# Patient Record
Sex: MALE | NOT HISPANIC OR LATINO | URBAN - METROPOLITAN AREA
[De-identification: names, ages, dates, MRNs, and addresses within clinical notes are randomized per-mention and may not be internally consistent; named-entity substitution may affect disease eponyms.]

---

## 2017-09-27 ENCOUNTER — EMERGENCY (EMERGENCY)
Facility: HOSPITAL | Age: 22
LOS: 1 days | Discharge: HOME | End: 2017-09-27
Attending: EMERGENCY MEDICINE | Admitting: EMERGENCY MEDICINE
Payer: COMMERCIAL

## 2017-09-27 VITALS
RESPIRATION RATE: 16 BRPM | DIASTOLIC BLOOD PRESSURE: 62 MMHG | TEMPERATURE: 98 F | SYSTOLIC BLOOD PRESSURE: 127 MMHG | OXYGEN SATURATION: 98 % | HEART RATE: 58 BPM

## 2017-09-27 PROCEDURE — 72125 CT NECK SPINE W/O DYE: CPT

## 2017-09-27 PROCEDURE — 99284 EMERGENCY DEPT VISIT MOD MDM: CPT | Mod: 25

## 2017-09-27 PROCEDURE — 99284 EMERGENCY DEPT VISIT MOD MDM: CPT

## 2017-09-27 PROCEDURE — 72125 CT NECK SPINE W/O DYE: CPT | Mod: 26

## 2017-09-27 RX ORDER — ACETAMINOPHEN 500 MG
975 TABLET ORAL ONCE
Qty: 0 | Refills: 0 | Status: COMPLETED | OUTPATIENT
Start: 2017-09-27 | End: 2017-09-27

## 2017-09-27 RX ORDER — IBUPROFEN 200 MG
600 TABLET ORAL ONCE
Qty: 0 | Refills: 0 | Status: COMPLETED | OUTPATIENT
Start: 2017-09-27 | End: 2017-09-27

## 2017-09-27 RX ADMIN — Medication 975 MILLIGRAM(S): at 17:33

## 2017-09-27 NOTE — ED ADULT NURSE NOTE - OBJECTIVE STATEMENT
22 y.o. Male BIBA for neck injury. Pt reports practicing lacrosse by self and a dummy in the morning at approx 0730am and pt tripped and hit the dummy head first. Pt states he "speared" the dummy and felt a tooth chip on his R canine tooth. Denies LOC. Ambulatory afterwards. Pt was feeling worsening and stiffening pain on neck and "traps." Pt tried muscle relaxer cream - partial relief. Neuro intact. Denies numbness/tingling, changes in vision/hearing. Pt is in no current distress. Comfort and safety provided. Will continue to monitor.

## 2017-09-27 NOTE — ED PROVIDER NOTE - ATTENDING CONTRIBUTION TO CARE
Pt with posterior neck and trapezius pain after falling during lacrosse practice.  No focal neuro deficits, mild td mid cervical.

## 2017-09-27 NOTE — ED PROVIDER NOTE - OBJECTIVE STATEMENT
22 YOM sent in from [facility] for neck injury. Pt fell forward while playing lacrosse this morning, hitting his head into a training dummy. Pt heard a crack and crunching sound in his neck and has had progressively worsening neck pain then. PT denies any numbness, tingling or weakness in his extremities. Pt denies any fevers, chills.

## 2017-10-19 PROBLEM — Z00.00 ENCOUNTER FOR PREVENTIVE HEALTH EXAMINATION: Status: ACTIVE | Noted: 2017-10-19

## 2017-10-23 ENCOUNTER — APPOINTMENT (OUTPATIENT)
Dept: ORTHOPEDIC SURGERY | Facility: CLINIC | Age: 22
End: 2017-10-23
Payer: OTHER GOVERNMENT

## 2017-10-23 VITALS
BODY MASS INDEX: 25.77 KG/M2 | HEIGHT: 70 IN | DIASTOLIC BLOOD PRESSURE: 79 MMHG | WEIGHT: 180 LBS | HEART RATE: 62 BPM | SYSTOLIC BLOOD PRESSURE: 144 MMHG

## 2017-10-23 DIAGNOSIS — Z78.9 OTHER SPECIFIED HEALTH STATUS: ICD-10-CM

## 2017-10-23 DIAGNOSIS — Z56.0 UNEMPLOYMENT, UNSPECIFIED: ICD-10-CM

## 2017-10-23 PROCEDURE — 99203 OFFICE O/P NEW LOW 30 MIN: CPT

## 2017-10-23 RX ORDER — CYCLOBENZAPRINE HYDROCHLORIDE 5 MG/1
5 TABLET, FILM COATED ORAL
Qty: 18 | Refills: 0 | Status: ACTIVE | COMMUNITY
Start: 2017-09-28

## 2017-10-23 RX ORDER — MONTELUKAST 10 MG/1
10 TABLET, FILM COATED ORAL
Qty: 30 | Refills: 0 | Status: ACTIVE | COMMUNITY
Start: 2017-07-03

## 2017-10-23 RX ORDER — IBUPROFEN 800 MG/1
800 TABLET, FILM COATED ORAL
Qty: 30 | Refills: 0 | Status: ACTIVE | COMMUNITY
Start: 2017-09-28

## 2017-10-23 RX ORDER — AZELASTINE HYDROCHLORIDE 137 UG/1
0.1 SPRAY, METERED NASAL
Qty: 30 | Refills: 0 | Status: ACTIVE | COMMUNITY
Start: 2017-06-22

## 2017-10-23 RX ORDER — MUPIROCIN 20 MG/G
2 OINTMENT TOPICAL
Qty: 22 | Refills: 0 | Status: ACTIVE | COMMUNITY
Start: 2017-07-21

## 2017-10-23 RX ORDER — KETOCONAZOLE 20 MG/G
2 CREAM TOPICAL
Qty: 60 | Refills: 0 | Status: ACTIVE | COMMUNITY
Start: 2017-07-21

## 2017-10-23 SDOH — ECONOMIC STABILITY - INCOME SECURITY: UNEMPLOYMENT, UNSPECIFIED: Z56.0

## 2017-11-17 ENCOUNTER — APPOINTMENT (OUTPATIENT)
Dept: ORTHOPEDIC SURGERY | Facility: CLINIC | Age: 22
End: 2017-11-17

## 2017-12-14 ENCOUNTER — APPOINTMENT (OUTPATIENT)
Dept: ORTHOPEDIC SURGERY | Facility: CLINIC | Age: 22
End: 2017-12-14
Payer: OTHER GOVERNMENT

## 2017-12-14 VITALS
SYSTOLIC BLOOD PRESSURE: 136 MMHG | HEIGHT: 70 IN | BODY MASS INDEX: 25.77 KG/M2 | WEIGHT: 180 LBS | DIASTOLIC BLOOD PRESSURE: 76 MMHG | HEART RATE: 66 BPM

## 2017-12-14 DIAGNOSIS — M54.2 CERVICALGIA: ICD-10-CM

## 2017-12-14 PROCEDURE — 99213 OFFICE O/P EST LOW 20 MIN: CPT

## 2017-12-14 RX ORDER — SULFAMETHOXAZOLE AND TRIMETHOPRIM 800; 160 MG/1; MG/1
800-160 TABLET ORAL
Qty: 14 | Refills: 0 | Status: ACTIVE | COMMUNITY
Start: 2017-07-24

## 2022-06-30 NOTE — ED ADULT NURSE NOTE - CAS TRG GEN SKIN CONDITION
Aware of patient. Increase pain after exercising. History of TKA. On Coumadin. Will get CT of knee and look for effusion/hemarthrosis and evaluate extensor mechanism.    Warm/Dry

## 2023-08-18 DIAGNOSIS — M25.551 BILATERAL HIP PAIN: Primary | ICD-10-CM

## 2023-08-18 DIAGNOSIS — M25.552 BILATERAL HIP PAIN: Primary | ICD-10-CM

## 2023-08-22 ENCOUNTER — OFFICE VISIT (OUTPATIENT)
Dept: ORTHOPEDICS | Facility: CLINIC | Age: 28
End: 2023-08-22
Payer: OTHER GOVERNMENT

## 2023-08-22 ENCOUNTER — HOSPITAL ENCOUNTER (OUTPATIENT)
Dept: RADIOLOGY | Facility: HOSPITAL | Age: 28
Discharge: HOME | End: 2023-08-22
Attending: ORTHOPAEDIC SURGERY
Payer: OTHER GOVERNMENT

## 2023-08-22 VITALS — BODY MASS INDEX: 24.34 KG/M2 | WEIGHT: 170 LBS | HEIGHT: 70 IN

## 2023-08-22 DIAGNOSIS — M54.16 LUMBAR RADICULOPATHY, CHRONIC: Primary | ICD-10-CM

## 2023-08-22 DIAGNOSIS — M25.552 BILATERAL HIP PAIN: ICD-10-CM

## 2023-08-22 DIAGNOSIS — M25.551 BILATERAL HIP PAIN: ICD-10-CM

## 2023-08-22 PROCEDURE — 73521 X-RAY EXAM HIPS BI 2 VIEWS: CPT | Mod: TC,PO

## 2023-08-22 PROCEDURE — 99204 OFFICE O/P NEW MOD 45 MIN: CPT | Mod: S$PBB,,, | Performed by: ORTHOPAEDIC SURGERY

## 2023-08-22 PROCEDURE — 99213 OFFICE O/P EST LOW 20 MIN: CPT | Mod: PBBFAC,PN | Performed by: ORTHOPAEDIC SURGERY

## 2023-08-22 PROCEDURE — 73521 X-RAY EXAM HIPS BI 2 VIEWS: CPT | Mod: 26,,, | Performed by: RADIOLOGY

## 2023-08-22 PROCEDURE — 99999 PR PBB SHADOW E&M-EST. PATIENT-LVL III: CPT | Mod: PBBFAC,,, | Performed by: ORTHOPAEDIC SURGERY

## 2023-08-22 PROCEDURE — 99204 PR OFFICE/OUTPT VISIT, NEW, LEVL IV, 45-59 MIN: ICD-10-PCS | Mod: S$PBB,,, | Performed by: ORTHOPAEDIC SURGERY

## 2023-08-22 PROCEDURE — 99999 PR PBB SHADOW E&M-EST. PATIENT-LVL III: ICD-10-PCS | Mod: PBBFAC,,, | Performed by: ORTHOPAEDIC SURGERY

## 2023-08-22 PROCEDURE — 73521 XR HIPS BILATERAL 2 VIEW INCL AP PELVIS: ICD-10-PCS | Mod: 26,,, | Performed by: RADIOLOGY

## 2023-08-22 NOTE — PROGRESS NOTES
Chief Complaint   Patient presents with    Left Hip - Pain    Right Hip - Pain       HPI:    This is a 28 y.o. who presents today complaining of left leg numbness and pain for 2 years after no known trauma. Pain is throbbing. No new numbness or tingling. No associated signs or symptoms.      History reviewed. No pertinent past medical history.   History reviewed. No pertinent surgical history.   No current outpatient medications on file prior to visit.     No current facility-administered medications on file prior to visit.      Review of patient's allergies indicates:  No Known Allergies   Family History not pertinent   Social History     Socioeconomic History    Marital status: Single         Review of Systems:   Constitutional:  Denies fever or chills    Eyes:  Denies change in visual acuity    HENT:  Denies nasal congestion or sore throat    Respiratory:  Denies cough or shortness of breath    Cardiovascular:  Denies chest pain or edema    GI:  Denies abdominal pain, nausea, vomiting, bloody stools or diarrhea    :  Denies dysuria    Integument:  Denies rash    Neurologic:  Denies headache, focal weakness or sensory changes    Endocrine:  Denies polyuria or polydipsia    Lymphatic:  Denies swollen glands    Psychiatric:  Denies depression or anxiety       Physical Exam:    Constitutional:  Well developed, well nourished, no acute distress, non-toxic appearance    Integument:  Well hydrated, no rash    Lymphatic:  No lymphadenopathy noted    Neurologic:  Alert & oriented x 3,     Psychiatric:  Speech and behavior appropriate    Gi: abdomen soft  Eyes: EOMI   L spine  No point TTP or stepoffs. Overall normal alignment. Worsened with ROM. Skin intact. +radiculopathy about the left S1 nerve root distribution. Compartments soft. Foot perfused.      X-rays were performed today, personally reviewed by me and findings discussed with the patient.   2 views of the lumbar spine show no fractures or dislocations with mild  listhesis about L5-S1      Lumbar radiculopathy, chronic  -     Ambulatory referral/consult to Pain Clinic; Future; Expected date: 08/29/2023        Will refer to pain management. RTC as needed.

## 2023-08-30 ENCOUNTER — HOSPITAL ENCOUNTER (EMERGENCY)
Facility: OTHER | Age: 28
Discharge: HOME | End: 2023-08-30
Attending: EMERGENCY MEDICINE
Payer: OTHER GOVERNMENT

## 2023-08-30 VITALS
SYSTOLIC BLOOD PRESSURE: 119 MMHG | OXYGEN SATURATION: 98 % | HEIGHT: 70 IN | TEMPERATURE: 98 F | RESPIRATION RATE: 20 BRPM | BODY MASS INDEX: 24.34 KG/M2 | HEART RATE: 78 BPM | WEIGHT: 170 LBS | DIASTOLIC BLOOD PRESSURE: 75 MMHG

## 2023-08-30 DIAGNOSIS — M51.36 BULGING LUMBAR DISC: ICD-10-CM

## 2023-08-30 DIAGNOSIS — M54.50 ACUTE BILATERAL LOW BACK PAIN WITHOUT SCIATICA: Primary | ICD-10-CM

## 2023-08-30 PROCEDURE — 63600175 PHARM REV CODE 636 W HCPCS: Performed by: EMERGENCY MEDICINE

## 2023-08-30 PROCEDURE — 96372 THER/PROPH/DIAG INJ SC/IM: CPT | Performed by: EMERGENCY MEDICINE

## 2023-08-30 PROCEDURE — 99285 EMERGENCY DEPT VISIT HI MDM: CPT | Mod: 25

## 2023-08-30 RX ORDER — DEXTROAMPHETAMINE SULFATE 10 MG/1
10 CAPSULE, EXTENDED RELEASE ORAL
COMMUNITY
Start: 2023-05-25

## 2023-08-30 RX ORDER — IBUPROFEN 600 MG/1
600 TABLET ORAL EVERY 6 HOURS PRN
Qty: 20 TABLET | Refills: 0 | Status: SHIPPED | OUTPATIENT
Start: 2023-08-30

## 2023-08-30 RX ORDER — ZOLPIDEM TARTRATE 10 MG/1
10 TABLET ORAL
COMMUNITY
Start: 2023-05-25

## 2023-08-30 RX ORDER — METHYLPREDNISOLONE 4 MG/1
TABLET ORAL
Qty: 1 EACH | Refills: 0 | Status: SHIPPED | OUTPATIENT
Start: 2023-08-30 | End: 2023-09-20

## 2023-08-30 RX ORDER — PREDNISONE 20 MG/1
TABLET ORAL
COMMUNITY
Start: 2023-05-06

## 2023-08-30 RX ORDER — DEXAMETHASONE SODIUM PHOSPHATE 4 MG/ML
8 INJECTION, SOLUTION INTRA-ARTICULAR; INTRALESIONAL; INTRAMUSCULAR; INTRAVENOUS; SOFT TISSUE
Status: COMPLETED | OUTPATIENT
Start: 2023-08-30 | End: 2023-08-30

## 2023-08-30 RX ORDER — KETOROLAC TROMETHAMINE 30 MG/ML
10 INJECTION, SOLUTION INTRAMUSCULAR; INTRAVENOUS
Status: COMPLETED | OUTPATIENT
Start: 2023-08-30 | End: 2023-08-30

## 2023-08-30 RX ORDER — ACETAMINOPHEN 500 MG
1000 TABLET ORAL EVERY 6 HOURS PRN
Qty: 50 TABLET | Refills: 0 | Status: SHIPPED | OUTPATIENT
Start: 2023-08-30

## 2023-08-30 RX ORDER — METHOCARBAMOL 500 MG/1
500 TABLET, FILM COATED ORAL 3 TIMES DAILY PRN
Qty: 30 TABLET | Refills: 0 | Status: SHIPPED | OUTPATIENT
Start: 2023-08-30 | End: 2023-09-09

## 2023-08-30 RX ADMIN — DEXAMETHASONE SODIUM PHOSPHATE 8 MG: 4 INJECTION INTRA-ARTICULAR; INTRALESIONAL; INTRAMUSCULAR; INTRAVENOUS; SOFT TISSUE at 03:08

## 2023-08-30 RX ADMIN — KETOROLAC TROMETHAMINE 10 MG: 30 INJECTION, SOLUTION INTRAMUSCULAR; INTRAVENOUS at 03:08

## 2023-08-30 NOTE — DISCHARGE INSTRUCTIONS
Mr. Mcleod,    You have a herniated disc at the L5-S1 space causing your symptoms.   Please schedule an appointment [facility] for spine clinic followup.    Thank you for letting me care for you today! It was nice meeting you, and I hope you feel better soon.   If you would like access to your chart and what was done today please utilize the [facility].   Please come back to [facility] for all of your future medical needs.    Our goal in the emergency department is to always give you outstanding care and exceptional service. You may receive a survey by mail or e-mail in the next week regarding your experience in our ED. We would greatly appreciate you completing and returning the survey. Your feedback provides us with a way to recognize our staff who give very good care and it helps us learn how to improve when your experience was below our aspiration of excellence.     Sincerely,    Roderick Mejia MD  Board Certified Emergency Physician   
No

## 2023-08-30 NOTE — ED PROVIDER NOTES
"Encounter Date: 8/30/2023    SCRIBE #1 NOTE: I, Tabathaart Mosley, am scribing for, and in the presence of,  Roderick Mejia MD.       History     Chief Complaint   Patient presents with    Back Pain     Reports lower back pain onset Wednesday, worsening Monday while walking his dog. States 'i think its a disc issue". Reports hx of back problems r/t service in the . Has been unable to stand up/sit without 10/10 pain. Denies hx of MRIs.      Toni Mcleod is a 28 y.o. male who presents to the ED with lower back pain for 3 days. He reports sudden sharp pains while walking his dog and he has been unable to stand or walk without severe pain since then. He denies urinary or fecal incontinence, though he reports some pain with bowel movements. He is a  in the . He reports a history of similar back pain 1 year ago after sustaining G forces during a flight, though his pain was not as severe as his current pain. He has been going to physical therapy since, and reports relief with ice and ibuprofen with his previous pain flare-ups. He states that his flight doctor recommended that he get an MRI. He has not had an MRI in the past.     The history is provided by the patient.     Review of patient's allergies indicates:  No Known Allergies  History reviewed. No pertinent past medical history.  History reviewed. No pertinent surgical history.  History reviewed. No pertinent family history.     Review of Systems  Constitutional-no fever  HEENT-no congestion  Eyes-no redness  Respiratory-no shortness of breath  Cardio-no chest pain  GI-no abdominal pain  Endocrine-no cold intolerance  -no difficulty urinating  MSK-no myalgias, notes back pain  Skin-no rashes  Allergy-no environmental allergy  Neurologic-no headache  Hematology-no swollen nodes  Behavioral-no confusion    Physical Exam     Initial Vitals [08/30/23 1226]   BP Pulse Resp Temp SpO2   109/64 78 20 98.2 °F (36.8 °C) 98 %      MAP       --     "     Physical Exam  Constitutional:  Uncomfortable appearing 28-year-old man in moderate distress  Eyes: Conjunctivae normal.  ENT       Head: Normocephalic, atraumatic.       Nose: No congestion.       Mouth/Throat: Mucous membranes are moist.  Hematological/Lymphatic/Immunilogical: No cervical lymphadenopathy.  Cardiovascular: Normal rate, regular rhythm. Normal and symmetric distal pulses.  Respiratory: Normal respiratory effort. Breath sounds are normal.  Gastrointestinal: Soft, nontender.   Musculoskeletal: Normal range of motion in all extremities.  Positive straight leg raise test bilaterally  Neurologic: Alert, oriented. Normal speech and language. No gross focal neurologic deficits are appreciated.  Skin: Skin is warm, dry. No rash noted.  Psychiatric: Mood and affect are normal.     ED Course   Procedures  Labs Reviewed - No data to display       Imaging Results              CT Lumbar Spine Without Contrast (Final result)  Result time 08/30/23 16:20:43      Final result by Asif Narvaez MD (08/30/23 16:20:43)                   Impression:      No evidence of acute fracture or traumatic malalignment of the lumbar spine.    Diffuse disc bulge at the L5-S1 level with superimposed central disc protrusion.  Noncontrast MRI of the lumbar spine may be obtained for further characterization.    Additional findings as above.      Electronically signed by: Asif Narvaez MD  Date:    08/30/2023  Time:    16:20               Narrative:    EXAMINATION:  CT LUMBAR SPINE WITHOUT CONTRAST    CLINICAL HISTORY:  Low back pain, symptoms persist with > 6wks conservative treatment;Low back pain, progressive neurologic deficit;    TECHNIQUE:  Low-dose axial, sagittal and coronal reformations are obtained through the lumbar spine.  Contrast was not administered.    COMPARISON:  X-ray of the lumbar spine dated 08/22/2023.    FINDINGS:  There is straightening of normal lumbar lordosis.  There is also minimal leftward curvature of the  lumbar alignment.  There is no evidence of listhesis.    There are 5 lumbar type vertebral bodies.  The vertebral body heights are maintained.  The posterior elements are unremarkable.    There is disc desiccation in the lower lumbar spine.  Evaluation of the individual disc levels reveals the following:    L1-L2, unremarkable.    L2-L3, unremarkable    L3-L4, unremarkable    L4-L5, there is a disc bulge along with facet hypertrophy and ligamentum flavum hypertrophy.  No significant spinal canal narrowing identified.  The neural foramina is unremarkable.    L5-S1, there is diffuse disc bulge along with facet hypertrophy and ligamentum flavum hypertrophy.  There is superimposed central disc protrusion.  There is suspected moderate narrowing of the spinal canal.  There is mild bilateral neural foraminal narrowing.    There are minimal degenerative changes in the sacroiliac joints.  There is a small osteophyte along the anterior aspect of the right sacroiliac joint.  There are no erosive changes.    The paraspinal soft tissues are within normal limits.                                       Medications   ketorolac injection 9.999 mg (9.999 mg Intramuscular Given 8/30/23 1517)   dexAMETHasone injection 8 mg (8 mg Intramuscular Given 8/30/23 1518)     Medical Decision Making  Low back pain is a profoundly broad differential including benign back strains and spasms to serious structural issues such as cauda equina syndrome, prolapsed disc, epidural abscess or pathologic fractures even to back pain mimics like pyelonephritis, AAA or ischemic bowel.    This patient does not have significant fever, no neurologic impairment to suggest compressive lesion such as epidural abscess or cauda equina syndrome.   Reflexes are appropriate and at baseline.     The differential was considered and the appropriate diagnostics were ordered therefore with a disposition determination in line with the most likely underlying cause.      Discussed extensively the need for outpatient follow-up, MRI ordered, referral placed for spine evaluation.  Discussed return precautions related to cauda equina syndrome and compressive lesion.    Amount and/or Complexity of Data Reviewed  Independent Historian: spouse     Details: This past notes intensifying symptoms  External Data Reviewed: labs, radiology, ECG and notes.     Details: Previously evaluated in orthopedics clinic referred for spine evaluation unable to obtain  Radiology: ordered and independent interpretation performed.     Details: Independently reviewed CT imaging of the lumbar spine and agree with Radiology interpretation    Risk  OTC drugs.  Prescription drug management.  Decision regarding hospitalization.            Scribe Attestation:   Scribe #1: I performed the above scribed service and the documentation accurately describes the services I performed. I attest to the accuracy of the note.    Physician Attestation for Scribe: I, roderick mejia, reviewed documentation as scribed in my presence, which is both accurate and complete.                      Clinical Impression:   Final diagnoses:  [M54.50] Acute bilateral low back pain without sciatica (Primary)  [M51.36] Bulging lumbar disc        ED Disposition Condition    Discharge Stable          ED Prescriptions       Medication Sig Dispense Start Date End Date Auth. Provider    methylPREDNISolone (MEDROL DOSEPACK) 4 mg tablet Take as package directs 1 each 8/30/2023 9/20/2023 Roderick Mejia MD    methocarbamoL (ROBAXIN) 500 MG Tab Take 1 tablet (500 mg total) by mouth 3 (three) times daily as needed (spasm). 30 tablet 8/30/2023 9/9/2023 Roderick Mejia MD    ibuprofen (ADVIL,MOTRIN) 600 MG tablet Take 1 tablet (600 mg total) by mouth every 6 (six) hours as needed for Pain. 20 tablet 8/30/2023 -- Roderick Mejia MD    acetaminophen (TYLENOL) 500 MG tablet Take 2 tablets (1,000 mg total) by mouth every 6 (six) hours as  needed. 50 tablet 8/30/2023 -- Roderick Mejia MD          Follow-up Information       Follow up With Specialties Details Why Contact Info Additional Information    [facility] Schedule an appointment as soon as possible for a visit  For a follow up visit about today [address], [address]  [facility] [address]  [phone] [facility], 4th Floor Please park in [place] [address] and use [place] elevators             Roderick Mejia MD  08/30/23 4812

## 2023-08-31 DIAGNOSIS — M54.16 LUMBAR RADICULOPATHY: Primary | ICD-10-CM

## 2023-09-01 ENCOUNTER — NURSE TRIAGE (OUTPATIENT)
Dept: ADMINISTRATIVE | Facility: CLINIC | Age: 28
End: 2023-09-01
Payer: OTHER GOVERNMENT

## 2023-09-01 NOTE — TELEPHONE ENCOUNTER
Reason for Disposition   Nursing judgment    Protocols used: Information Only Call - No Triage-A-OH    Pt states he has an upcoming appointment with Dr. Cabral on 9/5/23. States he has a question for the office nurse. Advised pt this message will go to the Provider and office staff now to contact him directly. Pt verbalized understanding.

## 2023-09-05 ENCOUNTER — TELEPHONE (OUTPATIENT)
Dept: PAIN MEDICINE | Facility: CLINIC | Age: 28
End: 2023-09-05
Payer: OTHER GOVERNMENT

## 2023-09-05 ENCOUNTER — OFFICE VISIT (OUTPATIENT)
Dept: SPINE | Facility: CLINIC | Age: 28
End: 2023-09-05
Payer: OTHER GOVERNMENT

## 2023-09-05 VITALS
WEIGHT: 170 LBS | BODY MASS INDEX: 24.34 KG/M2 | HEIGHT: 70 IN | DIASTOLIC BLOOD PRESSURE: 63 MMHG | SYSTOLIC BLOOD PRESSURE: 121 MMHG | HEART RATE: 63 BPM

## 2023-09-05 DIAGNOSIS — M51.36 BULGING LUMBAR DISC: ICD-10-CM

## 2023-09-05 DIAGNOSIS — M54.16 LUMBAR RADICULOPATHY: ICD-10-CM

## 2023-09-05 DIAGNOSIS — M51.36 DDD (DEGENERATIVE DISC DISEASE), LUMBAR: Primary | ICD-10-CM

## 2023-09-05 PROCEDURE — 99204 OFFICE O/P NEW MOD 45 MIN: CPT | Mod: S$PBB,,, | Performed by: STUDENT IN AN ORGANIZED HEALTH CARE EDUCATION/TRAINING PROGRAM

## 2023-09-05 PROCEDURE — 99999 PR PBB SHADOW E&M-EST. PATIENT-LVL IV: ICD-10-PCS | Mod: PBBFAC,,, | Performed by: STUDENT IN AN ORGANIZED HEALTH CARE EDUCATION/TRAINING PROGRAM

## 2023-09-05 PROCEDURE — 99214 OFFICE O/P EST MOD 30 MIN: CPT | Mod: PBBFAC | Performed by: STUDENT IN AN ORGANIZED HEALTH CARE EDUCATION/TRAINING PROGRAM

## 2023-09-05 PROCEDURE — 99204 PR OFFICE/OUTPT VISIT, NEW, LEVL IV, 45-59 MIN: ICD-10-PCS | Mod: S$PBB,,, | Performed by: STUDENT IN AN ORGANIZED HEALTH CARE EDUCATION/TRAINING PROGRAM

## 2023-09-05 PROCEDURE — 99999 PR PBB SHADOW E&M-EST. PATIENT-LVL IV: CPT | Mod: PBBFAC,,, | Performed by: STUDENT IN AN ORGANIZED HEALTH CARE EDUCATION/TRAINING PROGRAM

## 2023-09-05 RX ORDER — GABAPENTIN 300 MG/1
300 CAPSULE ORAL 3 TIMES DAILY
Qty: 90 CAPSULE | Refills: 1 | Status: SHIPPED | OUTPATIENT
Start: 2023-09-05 | End: 2023-11-04

## 2023-09-05 NOTE — PROGRESS NOTES
Chronic Pain - New Consult    Referring Physician: Roderick Mejia MD    Chief Complaint:   Chief Complaint   Patient presents with    Back Pain        SUBJECTIVE:    Toni Mcleod presents to the clinic for the evaluation of lower back and left leg > right leg pain. The pain started about 2 years ago (October 2021) following a walk with his dog and symptoms have been unchanged.  He states he was able to manage with physical therapy in the past, however now it has been 8 days since he's been able to walk due to the pain.  The pain is located in the lower back area and radiates to the left > right leg.  The pain is described as sharp and shooting and is rated as 8/10.  He states the pain radiates to the front of his legs (also in the ankles) The pain is rated with a score of  4/10 on the BEST day and a score of 10/10 on the WORST day.  Symptoms interfere with daily activity, sleeping, and work. The pain is exacerbated by Sitting and Coughing/Sneezing, and standing.  The pain is mitigated by laying down and laying prone. The patient reports 5 hours of uninterrupted sleep per night.    He presented to the Emergency department on 8/30/23 due to this pain and was started on medrol dose pack, tylenol, robaxin, and ibuprofen.    Patient denies urinary incontinence, bowel incontinence, and significant motor weakness.    Physical Therapy/Home Exercise: yes, completed PT on Thursday      Pain Disability Index Review:       No data to display                Pain Medications:   - tizanidine   - medrol dose pack   - ibuprofen   - tyleneol     report:  Reviewed and consistent with medication use as prescribed.    Pain Procedures:   None    Imaging:   CT LUMBAR SPINE WITHOUT CONTRAST     CLINICAL HISTORY:  Low back pain, symptoms persist with > 6wks conservative treatment;Low back pain, progressive neurologic deficit;     TECHNIQUE:  Low-dose axial, sagittal and coronal reformations are obtained through the lumbar spine.   Contrast was not administered.     COMPARISON:  X-ray of the lumbar spine dated 08/22/2023.     FINDINGS:  There is straightening of normal lumbar lordosis.  There is also minimal leftward curvature of the lumbar alignment.  There is no evidence of listhesis.     There are 5 lumbar type vertebral bodies.  The vertebral body heights are maintained.  The posterior elements are unremarkable.     There is disc desiccation in the lower lumbar spine.  Evaluation of the individual disc levels reveals the following:     L1-L2, unremarkable.     L2-L3, unremarkable     L3-L4, unremarkable     L4-L5, there is a disc bulge along with facet hypertrophy and ligamentum flavum hypertrophy.  No significant spinal canal narrowing identified.  The neural foramina is unremarkable.     L5-S1, there is diffuse disc bulge along with facet hypertrophy and ligamentum flavum hypertrophy.  There is superimposed central disc protrusion.  There is suspected moderate narrowing of the spinal canal.  There is mild bilateral neural foraminal narrowing.     There are minimal degenerative changes in the sacroiliac joints.  There is a small osteophyte along the anterior aspect of the right sacroiliac joint.  There are no erosive changes.     The paraspinal soft tissues are within normal limits.     Impression:     No evidence of acute fracture or traumatic malalignment of the lumbar spine.     Diffuse disc bulge at the L5-S1 level with superimposed central disc protrusion.  Noncontrast MRI of the lumbar spine may be obtained for further characterization.     Additional findings as above.        Electronically signed by: Asif Narvaez MD  Date:                                            08/30/2023  Time:                                           16:20    XR HIPS BILATERAL 2 VIEW INCL AP PELVIS     CLINICAL HISTORY:  Pain in right hip     TECHNIQUE:  AP view of the pelvis and frogleg lateral views of both hips were performed.     COMPARISON:  None.      FINDINGS:  A fracture of either hip is not seen.  The femoral heads and acetabula are well maintained.  A 7 mm radiodensity in the intertrochanteric fracture of the right femur is a probable benign bone island.  Similar finding is not seen in the pelvic bones or left hip.  The sacroiliac joints are sharp and symmetric.     Impression:     7 mm probable benign bone island of the right femur otherwise negative radiographs of both hips.        Electronically signed by: Ambrocio Villa MD  Date:                                            08/22/2023  Time:                                           10:48    MRI Lumbar spine ([facility] - images and report reviewed with patient):  L5/S1 disc herniation with foraminal stenosis L > Right as well as abutting descending left S1 nerve root    History reviewed. No pertinent past medical history.  History reviewed. No pertinent surgical history.  Social History     Socioeconomic History    Marital status:      No family history on file.    Review of patient's allergies indicates:  No Known Allergies    Current Outpatient Medications   Medication Sig    acetaminophen (TYLENOL) 500 MG tablet Take 2 tablets (1,000 mg total) by mouth every 6 (six) hours as needed.    dextroamphetamine sulfate (DEXEDRINE SPANSULE) 10 MG 24 hr capsule Take 10 mg by mouth.    ibuprofen (ADVIL,MOTRIN) 600 MG tablet Take 1 tablet (600 mg total) by mouth every 6 (six) hours as needed for Pain.    methocarbamoL (ROBAXIN) 500 MG Tab Take 1 tablet (500 mg total) by mouth 3 (three) times daily as needed (spasm).    methylPREDNISolone (MEDROL DOSEPACK) 4 mg tablet Take as package directs    predniSONE (DELTASONE) 20 MG tablet Take by mouth.    gabapentin (NEURONTIN) 300 MG capsule Take 1 capsule (300 mg total) by mouth 3 (three) times daily.    zolpidem (AMBIEN) 10 mg Tab Take 10 mg by mouth.     No current facility-administered medications for this visit.       REVIEW OF SYSTEMS:  GENERAL:  No weight loss,  "malaise or fevers.  HEENT:  Negative for frequent or significant headaches.  NECK:  Negative for lumps, goiter, pain and significant neck swelling.  RESPIRATORY:  Negative for cough, wheezing or shortness of breath.  CARDIOVASCULAR:  Negative for chest pain, leg swelling or palpitations.  GI:  Negative for abdominal discomfort, blood in stools or black stools or change in bowel habits.  MUSCULOSKELETAL:  See HPI.  SKIN:  Negative for lesions, rash, and itching.  PSYCH:  Negative for sleep disturbance, mood disorder and recent psychosocial stressors.  HEMATOLOGY/LYMPHOLOGY:  Negative for prolonged bleeding, bruising easily or swollen nodes.  NEURO:   No history of headaches, syncope, paralysis, seizures or tremors.  All other reviewed and negative other than HPI.    OBJECTIVE:    /63 (BP Location: Left arm, Patient Position: Sitting, BP Method: Medium (Automatic))   Pulse 63   Ht 5' 10" (1.778 m)   Wt 77.1 kg (170 lb)   BMI 24.39 kg/m²     PHYSICAL EXAMINATION:  General appearance: Well appearing, in no acute distress, alert and appropriately communicative.  Psych:  Mood and affect appropriate.  Skin: Skin color, texture, turgor normal, no rashes or lesions, in both upper and lower body.  Head/face:  Atraumatic, normocephalic.  Cor: regular rate  Pulm: non-labored breathing  GI: Abdomen non-distended and non-tender.  Back: Straight leg raising in the sitting and supine positions is negative to radicular pain. No pain to palpation over the spine or paraspinal muscles. Normal range of motion without pain reproduction.  Extremities: Peripheral joint ROM is full and pain free without obvious instability or laxity in all four extremities. No deformities, edema, or skin discoloration. Good capillary refill.  Musculoskeletal: hip, sacroiliac and knee provocative maneuvers are negative. Bilateral upper and lower extremity strength is normal and symmetric.  No atrophy or tone abnormalities are noted.  Neuro: " Bilateral upper and lower extremity coordination and muscle stretch reflexes are physiologic and symmetric.  Negative Clonus. No loss of sensation is noted.  Gait: Normal.      ASSESSMENT: 28 y.o. year old male with lower back and leg pain, consistent with:     1. DDD (degenerative disc disease), lumbar  Ambulatory referral/consult to Physical/Occupational Therapy    gabapentin (NEURONTIN) 300 MG capsule    Procedure Order to Pain Management    CANCELED: Procedure Order to Pain Management      2. Bulging lumbar disc  Ambulatory referral/consult to [facility]      3. Lumbar radiculopathy  Ambulatory referral/consult to Physical/Occupational Therapy    gabapentin (NEURONTIN) 300 MG capsule    Procedure Order to Pain Management    CANCELED: Procedure Order to Pain Management        IMPRESSION: Mr. Mcleod is a 28 y.o gentleman who is a  with a history of lower back pain, presents with acute worsening of lower back pain which started while he was running with his dog. He has had good benefit from physical therapy in the past, however he feels that he is unable to fully participate in physical therapy due to the pain. His history, physical exam, and imaging is consistent with left > right L5 radiculopathy.    PLAN:   - I have stressed the importance of physical activity and a home exercise plan to help with pain and improve health.  - Patient can continue with medications for now since they are providing benefits, using them appropriately, and without side effects.   - start gabapentin 300mg three times a day.  Instructed on taking at night for 7 days, followed by twice daily for the following week, followed by three times a day thereafter.   - Referral for physical therapy   - I requested Mr. Mcleod to upload his recent MRI lumbar spine (and report) in the [facility] system   - Schedule for bilateral L5/S1 transforaminal injection  - RTC 6-8 weeks  - Counseled patient regarding the importance of  activity modification and physical therapy.    The above plan and management options were discussed at length with patient. Patient is in agreement with the above and verbalized understanding. It will be communicated with the referring physician via electronic record, fax, or mail.    Marianna Sloan  09/05/2023

## 2023-09-05 NOTE — TELEPHONE ENCOUNTER
----- Message from Bev Clemens sent at 9/5/2023  3:53 PM CDT -----      Name of Who is Calling: MAGGY BELCHER [[id number]]      What is the request in detail: Pt called to speak with the office to schedule procedure.Please contact to further discuss and advise.          Can the clinic reply by [facility]: Y      What [facility] [phone]

## 2023-09-05 NOTE — TELEPHONE ENCOUNTER
----- Message from Jeffrey Diaz sent at 9/5/2023  3:17 PM CDT -----  Name of Who is Calling:MAGGY BELCHER [[id number]]                What is the request in detail: Pt calling he was told that he would get a referral for a shot, but didn't see it on his chart.Please call back to further assist.                 Can the clinic reply by [facility]: No                What Number to Call Back if not in [facility]:[phone]

## 2023-09-05 NOTE — TELEPHONE ENCOUNTER
Patient requested to speak to the schedulers today as he is in a great deal of pain and would like to get back to work.     Patient transferred.

## 2023-09-06 ENCOUNTER — TELEPHONE (OUTPATIENT)
Dept: ADMINISTRATIVE | Facility: OTHER | Age: 28
End: 2023-09-06
Payer: OTHER GOVERNMENT

## 2023-09-06 ENCOUNTER — TELEPHONE (OUTPATIENT)
Dept: PAIN MEDICINE | Facility: CLINIC | Age: 28
End: 2023-09-06
Payer: OTHER GOVERNMENT

## 2023-09-06 ENCOUNTER — PATIENT MESSAGE (OUTPATIENT)
Dept: SPINE | Facility: CLINIC | Age: 28
End: 2023-09-06
Payer: OTHER GOVERNMENT

## 2023-09-06 NOTE — TELEPHONE ENCOUNTER
----- Message from Angie Andrea sent at 9/5/2023  1:30 PM CDT -----  Regarding: steroid inject scheduling  Contact: [phone]  Pt Toni is calling. Pt would like to schedule the steroid injection of the lower back. Please give him a call back in reference to scheduling this appt.

## 2023-09-06 NOTE — TELEPHONE ENCOUNTER
----- Message from Baltazar Flanagan sent at 9/5/2023  4:52 PM CDT -----  Name of Who is Calling:MAGGY BELCHER [[id number]]           What is the request in detail:pt stated that he would like to speak with the office directly in regards to his questions/concerns, PT DID NOT GIVE INFO ON WHAT IT WAS IN REGARDS TO .Please contact to further discuss and advise.         [phone]  Can the clinic reply by [facility]:           [facility][phone]

## 2023-09-06 NOTE — TELEPHONE ENCOUNTER
----- Message from Zeenat Clemens sent at 9/6/2023  1:55 PM CDT -----  Regarding: Appt  Contact: Pt @ [phone]  Pt is calling to get Epidural Injection schedule. Asking for a call back

## 2023-09-07 ENCOUNTER — TELEPHONE (OUTPATIENT)
Dept: PAIN MEDICINE | Facility: CLINIC | Age: 28
End: 2023-09-07
Payer: OTHER GOVERNMENT

## 2023-09-07 ENCOUNTER — PATIENT MESSAGE (OUTPATIENT)
Dept: PAIN MEDICINE | Facility: OTHER | Age: 28
End: 2023-09-07
Payer: OTHER GOVERNMENT

## 2023-09-07 DIAGNOSIS — M54.16 LUMBAR RADICULOPATHY: Primary | ICD-10-CM

## 2023-09-07 NOTE — TELEPHONE ENCOUNTER
----- Message from Nicole Mchugh sent at 9/6/2023  3:58 PM CDT -----  Pt. Will like procedure marked urgent , please advise.

## 2023-09-07 NOTE — TELEPHONE ENCOUNTER
----- Message from Selena Call sent at 9/6/2023  4:03 PM CDT -----  Regarding: appointment concerns  Name of Who is Calling: Opal, wife           What is the request in detail: Opal is requesting  to have the doctor to put URGENT on the procedure for patient to have ASAP. The doctor said he would put it urgent but he didn't javier it that way. They are upset and angry that no one has called back yet.           Can the clinic reply by [facility]: Yes           What Number to Call Back if not in [facility]: [phone]

## 2023-09-07 NOTE — TELEPHONE ENCOUNTER
----- Message from Lisa Diaz sent at 9/6/2023 12:01 PM CDT -----  Regarding: Injection  Type: Patient Call Back    Who called: Patient     What is the request in detail: Patient is requesting a callback in regards to the  bilateral L5/S1 transforaminal injection. Per patient chart he was informed the appointment was supposed to be scheduled at end of visit on yesterday. Pt is aware of the referral for Physical Therapy and is not calling in regards to that.     Can the clinic reply by [facility]?    Would the patient rather a call back or a response via [facility]? Callback     Best call back number:[phone]    Additional Information: Patient has been calling since yesterday and is requesting a callback as soon as possible regarding this injection. Please call as soon as possible

## 2023-09-07 NOTE — TELEPHONE ENCOUNTER
----- Message from Yasmine Leos sent at 9/7/2023  4:23 PM CDT -----  Regarding: pt call back  Name of Who is Calling: MAGGY BELCHER [[id number]]        What is the request in detail: pt says his procedure on 9/8, isn't covered unless dr. Marianna bey puts the order for the injection, would like a call back to discuss to get his procedure to get it covered, please advise.         Can the clinic reply by [facility]: no          What [facility] [phone]

## 2023-09-08 ENCOUNTER — TELEPHONE (OUTPATIENT)
Dept: PAIN MEDICINE | Facility: OTHER | Age: 28
End: 2023-09-08
Payer: OTHER GOVERNMENT

## 2023-09-08 ENCOUNTER — TELEPHONE (OUTPATIENT)
Dept: ADMINISTRATIVE | Facility: OTHER | Age: 28
End: 2023-09-08
Payer: OTHER GOVERNMENT

## 2023-09-08 ENCOUNTER — TELEPHONE (OUTPATIENT)
Dept: PAIN MEDICINE | Facility: CLINIC | Age: 28
End: 2023-09-08
Payer: OTHER GOVERNMENT

## 2023-09-08 ENCOUNTER — PATIENT MESSAGE (OUTPATIENT)
Dept: PAIN MEDICINE | Facility: OTHER | Age: 28
End: 2023-09-08
Payer: OTHER GOVERNMENT

## 2023-09-08 NOTE — TELEPHONE ENCOUNTER
----- Message from Jenelle Mehta LPN sent at 9/8/2023  7:20 AM CDT -----  Regarding: FW: Concern And Questions    ----- Message -----  From: Ke Bender MA  Sent: 9/7/2023   3:31 PM CDT  To: [facility] Pain Management Procedures  Subject: FW: Concern And Questions                          ----- Message -----  From: Subha Wesley  Sent: 9/7/2023   3:22 PM CDT  To: [facility] Staff  Subject: Concern And Questions                            Name of Who is Calling:  Patient          What is the request in detail:  Patient would like to know if he can get a taxi or an Uber home after his procedure            Can the clinic reply by [facility]: No            What Number to Call Back if not in [facility]:[phone]

## 2023-09-08 NOTE — TELEPHONE ENCOUNTER
----- Message from Subha Wesley sent at 9/8/2023  9:36 AM CDT -----  Regarding: Reschedule  Name of Who is Calling:  Patient          What is the request in detail:  Patient stated he would have to reschedule his procedure because of referral and insurance matters            Can the clinic reply by [facility]:Yes            What Number to [facility] Back [facility][phone]

## 2023-10-19 ENCOUNTER — PATIENT MESSAGE (OUTPATIENT)
Dept: PAIN MEDICINE | Facility: OTHER | Age: 28
End: 2023-10-19
Payer: OTHER GOVERNMENT

## 2024-12-11 ENCOUNTER — HOSPITAL ENCOUNTER (EMERGENCY)
Facility: HOSPITAL | Age: 29
Discharge: HOME | End: 2024-12-11
Attending: STUDENT IN AN ORGANIZED HEALTH CARE EDUCATION/TRAINING PROGRAM
Payer: OTHER GOVERNMENT

## 2024-12-11 VITALS
RESPIRATION RATE: 18 BRPM | DIASTOLIC BLOOD PRESSURE: 69 MMHG | SYSTOLIC BLOOD PRESSURE: 133 MMHG | BODY MASS INDEX: 24.34 KG/M2 | HEART RATE: 67 BPM | WEIGHT: 170 LBS | HEIGHT: 70 IN | TEMPERATURE: 98 F | OXYGEN SATURATION: 98 %

## 2024-12-11 DIAGNOSIS — K21.9 GASTROESOPHAGEAL REFLUX DISEASE, UNSPECIFIED WHETHER ESOPHAGITIS PRESENT: Primary | ICD-10-CM

## 2024-12-11 DIAGNOSIS — R10.13 EPIGASTRIC ABDOMINAL PAIN: ICD-10-CM

## 2024-12-11 DIAGNOSIS — R11.2 NAUSEA AND VOMITING, UNSPECIFIED VOMITING TYPE: ICD-10-CM

## 2024-12-11 LAB
ALBUMIN SERPL BCP-MCNC: 4.2 G/DL (ref 3.5–5.2)
ALP SERPL-CCNC: 56 U/L (ref 40–150)
ALT SERPL W/O P-5'-P-CCNC: 26 U/L (ref 10–44)
ANION GAP SERPL CALC-SCNC: 9 MMOL/L (ref 8–16)
AST SERPL-CCNC: 29 U/L (ref 10–40)
BASOPHILS # BLD AUTO: 0.04 K/UL (ref 0–0.2)
BASOPHILS NFR BLD: 1 % (ref 0–1.9)
BILIRUB SERPL-MCNC: 0.3 MG/DL (ref 0.1–1)
BUN SERPL-MCNC: 16 MG/DL (ref 6–20)
CALCIUM SERPL-MCNC: 9.3 MG/DL (ref 8.7–10.5)
CHLORIDE SERPL-SCNC: 105 MMOL/L (ref 95–110)
CO2 SERPL-SCNC: 25 MMOL/L (ref 23–29)
CREAT SERPL-MCNC: 1 MG/DL (ref 0.5–1.4)
DIFFERENTIAL METHOD BLD: ABNORMAL
EOSINOPHIL # BLD AUTO: 0.2 K/UL (ref 0–0.5)
EOSINOPHIL NFR BLD: 3.6 % (ref 0–8)
ERYTHROCYTE [DISTWIDTH] IN BLOOD BY AUTOMATED COUNT: 11.3 % (ref 11.5–14.5)
EST. GFR  (NO RACE VARIABLE): >60 ML/MIN/1.73 M^2
GLUCOSE SERPL-MCNC: 104 MG/DL (ref 70–110)
HCT VFR BLD AUTO: 43.4 % (ref 40–54)
HGB BLD-MCNC: 14.4 G/DL (ref 14–18)
IMM GRANULOCYTES # BLD AUTO: 0 K/UL (ref 0–0.04)
IMM GRANULOCYTES NFR BLD AUTO: 0 % (ref 0–0.5)
LIPASE SERPL-CCNC: 40 U/L (ref 4–60)
LYMPHOCYTES # BLD AUTO: 1.4 K/UL (ref 1–4.8)
LYMPHOCYTES NFR BLD: 34.1 % (ref 18–48)
MCH RBC QN AUTO: 30.8 PG (ref 27–31)
MCHC RBC AUTO-ENTMCNC: 33.2 G/DL (ref 32–36)
MCV RBC AUTO: 93 FL (ref 82–98)
MONOCYTES # BLD AUTO: 0.4 K/UL (ref 0.3–1)
MONOCYTES NFR BLD: 10.3 % (ref 4–15)
NEUTROPHILS # BLD AUTO: 2.1 K/UL (ref 1.8–7.7)
NEUTROPHILS NFR BLD: 51 % (ref 38–73)
NRBC BLD-RTO: 0 /100 WBC
PLATELET # BLD AUTO: 191 K/UL (ref 150–450)
PMV BLD AUTO: 9.9 FL (ref 9.2–12.9)
POTASSIUM SERPL-SCNC: 4.1 MMOL/L (ref 3.5–5.1)
PROT SERPL-MCNC: 7.5 G/DL (ref 6–8.4)
RBC # BLD AUTO: 4.68 M/UL (ref 4.6–6.2)
SODIUM SERPL-SCNC: 139 MMOL/L (ref 136–145)
WBC # BLD AUTO: 4.17 K/UL (ref 3.9–12.7)

## 2024-12-11 PROCEDURE — 93005 ELECTROCARDIOGRAM TRACING: CPT

## 2024-12-11 PROCEDURE — 85025 COMPLETE CBC W/AUTO DIFF WBC: CPT

## 2024-12-11 PROCEDURE — 25000003 PHARM REV CODE 250

## 2024-12-11 PROCEDURE — 83690 ASSAY OF LIPASE: CPT

## 2024-12-11 PROCEDURE — 99284 EMERGENCY DEPT VISIT MOD MDM: CPT | Mod: 25

## 2024-12-11 PROCEDURE — 93010 ELECTROCARDIOGRAM REPORT: CPT | Mod: ,,, | Performed by: INTERNAL MEDICINE

## 2024-12-11 PROCEDURE — 80053 COMPREHEN METABOLIC PANEL: CPT

## 2024-12-11 RX ORDER — ONDANSETRON 4 MG/1
8 TABLET, FILM COATED ORAL EVERY 12 HOURS PRN
Qty: 20 TABLET | Refills: 0 | Status: SHIPPED | OUTPATIENT
Start: 2024-12-11

## 2024-12-11 RX ORDER — FAMOTIDINE 20 MG/1
20 TABLET, FILM COATED ORAL 2 TIMES DAILY
Qty: 20 TABLET | Refills: 0 | Status: SHIPPED | OUTPATIENT
Start: 2024-12-11 | End: 2025-12-11

## 2024-12-11 RX ORDER — LIDOCAINE HYDROCHLORIDE 20 MG/ML
15 SOLUTION OROPHARYNGEAL ONCE
Status: COMPLETED | OUTPATIENT
Start: 2024-12-11 | End: 2024-12-11

## 2024-12-11 RX ORDER — ALUMINUM HYDROXIDE, MAGNESIUM HYDROXIDE, AND SIMETHICONE 1200; 120; 1200 MG/30ML; MG/30ML; MG/30ML
30 SUSPENSION ORAL ONCE
Status: COMPLETED | OUTPATIENT
Start: 2024-12-11 | End: 2024-12-11

## 2024-12-11 RX ORDER — OMEPRAZOLE 20 MG/1
20 CAPSULE, DELAYED RELEASE ORAL DAILY
Qty: 90 CAPSULE | Refills: 3 | Status: SHIPPED | OUTPATIENT
Start: 2024-12-11 | End: 2025-12-11

## 2024-12-11 RX ADMIN — ALUMINUM HYDROXIDE, MAGNESIUM HYDROXIDE, AND SIMETHICONE 30 ML: 1200; 120; 1200 SUSPENSION ORAL at 02:12

## 2024-12-11 RX ADMIN — LIDOCAINE HYDROCHLORIDE 15 ML: 20 SOLUTION ORAL at 02:12

## 2024-12-11 NOTE — Clinical Note
"Toni Marie" Harsha was seen and treated in our emergency department on 12/11/2024.  He may return to work on 12/12/2024.       If you have any questions or concerns, please don't hesitate to call.      Tanya Delgadillo PA-C"

## 2024-12-11 NOTE — DISCHARGE INSTRUCTIONS

## 2024-12-11 NOTE — ED PROVIDER NOTES
Encounter Date: 12/11/2024       History     Chief Complaint   Patient presents with    Abdominal Pain     Pt c/o of epigastric pain and LUQ with an episode of vomiting and a few episodes of diarrhea. Pt stated he has been taking OTC medications without relief.      29-year-old male presents to emergency department complaining of epigastric pain and reflux symptoms x 5 days.  Patient reports he has regular reflux symptoms.  He does endorse his mother was diagnosed with EOE, he has not formally been assessed by a GI specialist.  He states symptoms are worse after any p.o. intake.  States he has been having few episodes of vomiting as well as 1 episode of diarrhea.  He denies any hematemesis. He reports he has tried Nexium and Tums without any symptom relief.  He does endorse difficulty/pain with swallowing food during this flare, he has previously never experienced that.  He denies any fever, chest pain, shortness breath, GI bleed or other associated symptom.        Review of patient's allergies indicates:  No Known Allergies  History reviewed. No pertinent past medical history.  Past Surgical History:   Procedure Laterality Date    TRANSFORAMINAL EPIDURAL INJECTION OF STEROID Bilateral 10/20/2023    Procedure: INJECTION, STEROID, EPIDURAL, TRANSFORAMINAL APPROACH, BILATERAL L5-S1  urgent;  Surgeon: Lamont Finley MD;  Location: [facility];  Service: Pain Management;  Laterality: Bilateral;     No family history on file.  Social History     Tobacco Use    Smoking status: Never    Smokeless tobacco: Never   Substance Use Topics    Alcohol use: Never     Review of Systems   Constitutional:  Negative for chills and fever.   HENT:  Negative for congestion, ear pain, rhinorrhea and sore throat.    Eyes:  Negative for redness.   Respiratory:  Negative for shortness of breath and stridor.    Cardiovascular:  Negative for chest pain.   Gastrointestinal:  Positive for abdominal pain, diarrhea, nausea and vomiting.  Negative for constipation.   Genitourinary:  Negative for dysuria, frequency, hematuria and urgency.   Musculoskeletal:  Negative for back pain and neck pain.   Skin:  Negative for rash.   Neurological:  Negative for dizziness, speech difficulty, weakness, light-headedness and numbness.   Hematological:  Does not bruise/bleed easily.   Psychiatric/Behavioral:  Negative for confusion.        Physical Exam     Initial Vitals [12/11/24 1416]   BP Pulse Resp Temp SpO2   133/69 67 18 98.1 °F (36.7 °C) 98 %      MAP       --         Physical Exam    Nursing note and vitals reviewed.  Constitutional: He appears well-developed and well-nourished. No distress.   HENT:   Head: Normocephalic.   Right Ear: Hearing, tympanic membrane, external ear and ear canal normal.   Left Ear: Hearing, tympanic membrane, external ear and ear canal normal.   Nose: Nose normal. Mouth/Throat: Oropharynx is clear and moist. No oropharyngeal exudate, posterior oropharyngeal edema or posterior oropharyngeal erythema.   Eyes: Conjunctivae are normal.   Neck: Neck supple.   Cardiovascular:  Normal rate and regular rhythm.     Exam reveals no gallop and no friction rub.       No murmur heard.  Pulmonary/Chest: Breath sounds normal. No respiratory distress. He has no wheezes. He has no rhonchi. He has no rales.   Abdominal: Abdomen is soft. Bowel sounds are normal. He exhibits no distension. There is abdominal tenderness in the epigastric area and left upper quadrant. There is no rebound, no guarding, no tenderness at McBurney's point and negative Lees's sign. negative Rovsing's sign  Musculoskeletal:      Cervical back: Neck supple.     Lymphadenopathy:     He has no cervical adenopathy.   Neurological: He is alert.   Skin: Skin is warm and dry. No rash noted.   Psychiatric: He has a normal mood and affect.         ED Course   Procedures  Labs Reviewed   CBC W/ AUTO DIFFERENTIAL - Abnormal       Result Value    WBC 4.17      RBC 4.68       Hemoglobin 14.4      Hematocrit 43.4      MCV 93      MCH 30.8      MCHC 33.2      RDW 11.3 (*)     Platelets 191      MPV 9.9      Immature Granulocytes 0.0      Gran # (ANC) 2.1      Immature Grans (Abs) 0.00      Lymph # 1.4      Mono # 0.4      Eos # 0.2      Baso # 0.04      nRBC 0      Gran % 51.0      Lymph % 34.1      Mono % 10.3      Eosinophil % 3.6      Basophil % 1.0      Differential Method Automated     COMPREHENSIVE METABOLIC PANEL    Sodium 139      Potassium 4.1      Chloride 105      CO2 25      Glucose 104      BUN 16      Creatinine 1.0      Calcium 9.3      Total Protein 7.5      Albumin 4.2      Total Bilirubin 0.3      Alkaline Phosphatase 56      AST 29      ALT 26      eGFR >60      Anion Gap 9     LIPASE    Lipase 40            Imaging Results    None          Medications   aluminum-magnesium hydroxide-simethicone 200-200-20 mg/5 mL suspension 30 mL (30 mLs Oral Given 12/11/24 1446)     And   LIDOcaine viscous HCl 2% oral solution 15 mL (15 mLs Oral Given 12/11/24 1446)     Medical Decision Making  This is an emergent evaluation of a 29 y.o. male presenting to the ED for epigastric abdominal pain that is worse after eating. Denies CP, SOB, fever, and GI bleeding. Afebrile.     Given trial of GI cocktail in ED with immediate relief of symptoms. Remains well appearing with benign abdominal exam and stable vitals. Tolerating PO without complication.     Presentation most consistent with GERD/gastritis. May be developing in PUD.  I doubt perforated ulcer. Also having differential could be EOE due to the family history. I've also carefully considered but doubt acute pancreatitis, acute cholecystitis, pyogenic liver abscess, splenic infarction, esophageal impaction, ACS, pericarditis, aortic dissection, PNA, and PE. No Hx of trauma.     No indication for emergent endoscopy at this time.  Will send a referral to GI and instructed him to follow up for endoscope for further evaluation and  management.  Sent home with supportive care. We discuss supportive measure and avoidence of exacerbating agents. Advising close follow up with PCP and GI for further evaluation. We discuss strict return precautions, and patient is agreeable to plan.     I discussed with the patient the diagnosis, treatment plan, indications for return to the emergency department, and for expected follow-up. The patient verbalized an understanding. The patient is asked if there are any questions or concerns. We discuss the case, until all issues are addressed to the patient's satisfaction. Patient understands and is agreeable to the plan.         Amount and/or Complexity of Data Reviewed  Labs: ordered.  ECG/medicine tests: ordered and independent interpretation performed. Decision-making details documented in ED Course.    Risk  OTC drugs.  Prescription drug management.               ED Course as of 12/11/24 1533   Wed Dec 11, 2024   1450 EKG 12-lead  Independently interpreted by me: Normal sinus rhythm rate 73, rightward axis, no ST elevation or depression, no T-wave abnormality, no STEMI [MB]      ED Course User Index  [MB] Tanya Delgadillo PA-C                           Clinical Impression:  Final diagnoses:  [R10.13] Epigastric abdominal pain  [K21.9] Gastroesophageal reflux disease, unspecified whether esophagitis present (Primary)  [R11.2] Nausea and vomiting, unspecified vomiting type          ED Disposition Condition    Discharge Stable          ED Prescriptions       Medication Sig Dispense Start Date End Date Auth. Provider    ondansetron (ZOFRAN) 4 MG tablet Take 2 tablets (8 mg total) by mouth every 12 (twelve) hours as needed for Nausea. 20 tablet 12/11/2024 -- Tanya Delgadillo PA-C    omeprazole (PRILOSEC) 20 MG capsule Take 1 capsule (20 mg total) by mouth once daily. 90 capsule 12/11/2024 12/11/2025 Tanya Delgadillo PA-C    famotidine (PEPCID) 20 MG tablet Take 1 tablet (20 mg total) by mouth 2 (two) times  daily. 20 tablet 12/11/2024 12/11/2025 Tanya Delgadillo PA-C          Follow-up Information       Follow up With Specialties Details Why Contact Info    [facility] GASTROENTEROLOGY Gastroenterology   [address] [facility] [place] [address]  [phone]    [facility] - Emergency Dept Emergency Medicine Go to  If symptoms worsen, As needed, shortness of breath, chest pain, fever, worsening cough, nausea, vomiting, abdominal pain [address] [facility] [place] [address]  [phone]             Tanya Delgadillo PA-C  12/11/24 9909

## 2024-12-13 LAB
OHS QRS DURATION: 102 MS
OHS QTC CALCULATION: 423 MS